# Patient Record
(demographics unavailable — no encounter records)

---

## 2024-11-20 NOTE — PHYSICAL EXAM
[General Appearance - Well Developed] : well developed [Normal Appearance] : normal appearance [] : no respiratory distress [Oriented To Time, Place, And Person] : oriented to person, place, and time [Exaggerated Use Of Accessory Muscles For Inspiration] : no accessory muscle use

## 2024-11-20 NOTE — HISTORY OF PRESENT ILLNESS
[FreeTextEntry1] : Patient returns he reports that within the last month he has found out that his partner is pregnant.  We also discussed his recent ultrasound, semen analysis which has 82 million concentration 59% motility and 6% normal forms thus making all of the parameters within the normal range as well as his serum studies with a normal FSH his testosterone is 315 which is within the 100 points of the low end of normal and his free testosterone is slightly low at 3.6 with a 5.7 cutoff.